# Patient Record
Sex: FEMALE | Race: WHITE
[De-identification: names, ages, dates, MRNs, and addresses within clinical notes are randomized per-mention and may not be internally consistent; named-entity substitution may affect disease eponyms.]

---

## 2018-02-02 ENCOUNTER — HOSPITAL ENCOUNTER (EMERGENCY)
Dept: HOSPITAL 17 - NEPE | Age: 73
LOS: 1 days | Discharge: HOME | End: 2018-02-03
Payer: MEDICARE

## 2018-02-02 VITALS
HEART RATE: 103 BPM | OXYGEN SATURATION: 97 % | DIASTOLIC BLOOD PRESSURE: 63 MMHG | SYSTOLIC BLOOD PRESSURE: 140 MMHG | RESPIRATION RATE: 16 BRPM

## 2018-02-02 VITALS — BODY MASS INDEX: 26.72 KG/M2 | HEIGHT: 69 IN | WEIGHT: 180.38 LBS

## 2018-02-02 DIAGNOSIS — Z88.5: ICD-10-CM

## 2018-02-02 DIAGNOSIS — Z88.2: ICD-10-CM

## 2018-02-02 DIAGNOSIS — L50.9: Primary | ICD-10-CM

## 2018-02-02 DIAGNOSIS — F32.9: ICD-10-CM

## 2018-02-02 DIAGNOSIS — Z88.8: ICD-10-CM

## 2018-02-02 DIAGNOSIS — F03.90: ICD-10-CM

## 2018-02-02 DIAGNOSIS — I10: ICD-10-CM

## 2018-02-02 DIAGNOSIS — E78.00: ICD-10-CM

## 2018-02-02 DIAGNOSIS — Z79.82: ICD-10-CM

## 2018-02-02 DIAGNOSIS — Z88.0: ICD-10-CM

## 2018-02-02 PROCEDURE — 99284 EMERGENCY DEPT VISIT MOD MDM: CPT

## 2018-02-02 PROCEDURE — 96374 THER/PROPH/DIAG INJ IV PUSH: CPT

## 2018-02-02 NOTE — PD
HPI


Chief Complaint:  Allergic/Adverse Reaction


Time Seen by Provider:  22:37


Travel History


International Travel<30 days:  No


Contact w/Intl Traveler<30days:  No


Traveled to known affect area:  No





History of Present Illness


HPI


Patient is a 72-year-old female presents to the emergency department for 

evaluation of possible allergic reaction, patient states she has been having 

itchy hives on her chest abdomen back and upper extremities for the past few 

days, she has been taking Benadryl but without significant relief, she cannot 

think of any exacerbating factors is never had hives like this before.  Denies 

any shortness of breath denies any throat swelling lip swelling abdominal pain 

nausea vomiting or diarrhea.  She states the symptoms are severe, gradually 

worsening over the past few days, associated signs symptoms as above





PFSH


Past Medical History


Asthma:  No


Autoimmune Disease:  No


Anxiety:  Yes


Depression:  Yes


Heart Rhythm Problems:  No


Cancer:  No


Cardiovascular Problems:  No


High Cholesterol:  Yes


Chemotherapy:  No


Congestive Heart Failure:  No


COPD:  No


Dementia:  Yes


Diabetes:  No


Diminished Hearing:  No


Endocrine:  Yes (ESTROGEN PATCH WEEKLY)


Genitourinary:  No


Hypertension:  Yes (CURRENT, MED CONTROLLED)


Immune Disorder:  No


Musculoskeletal:  Yes (SCIATICA)


Neurologic:  Yes


Psychiatric:  Yes


Reproductive:  No


Respiratory:  No


Immunizations Current:  Yes


Radiation Therapy:  No


Seizures:  Yes


Sickle Cell Disease:  No


Sleep Apnea:  No


Thyroid Disease:  Yes


Menopausal:  Yes





Past Surgical History


Abdominal Surgery:  No


AICD:  No


Arteriovenous Shunt:  No


Body Medical Devices:  TENS UNIT IN ABDOMINAL CAVITY


Cardiac Surgery:  No


 Section:  Yes


Ear Surgery:  No


Endocrine Surgery:  No


Eye Surgery:  Yes (BILATERAL CATARACS)


Genitourinary Surgery:  No


Gynecologic Surgery:  Yes (HYSTERECTOMY)


Hysterectomy:  Yes


Insulin Pump:  No


Joint Replacement:  No


Oral Surgery:  No


Pacemaker:  No


Thoracic Surgery:  No


Other Surgery:  Yes





Social History


Alcohol Use:  Yes (ETOH ABUSE)


Tobacco Use:  No


Substance Use:  Yes





Allergies-Medications


(Allergen,Severity, Reaction):  


Coded Allergies:  


     Sulfa (Sulfonamide Antibiotics) (Unverified  Allergy, Severe, 8/15/17)


     ciprofloxacin (Unverified  Allergy, Severe, 8/15/17)


     codeine (Unverified  Allergy, Severe, 8/15/17)


     fluconazole (Unverified  Allergy, Severe, 8/15/17)


     hydrochlorothiazide (Unverified  Allergy, Severe, 8/15/17)


     morphine (Unverified  Allergy, Severe, 8/15/17)


     penicillin G (Unverified  Allergy, Severe, 8/15/17)


     triamterene (Unverified  Allergy, Severe, 8/15/17)


     MRI PRECAUTION (Verified  Adverse Reaction, Severe, 2/7/15)


 stimulator wires in cord Dr Mcmahon declined scanning of patient


 2014 [facility]


Reported Meds & Prescriptions





Reported Meds & Active Scripts


Active


Prednisone 20 Mg Tab 60 Mg PO DAILY 5 Days


Reported


Zolpidem (Zolpidem Tartrate) 5 Mg Tab 5 Mg PO HS PRN


Vitamin B-12 (Cyanocobalamin) 1,000 Mcg Subl 1,000 Mcg SL DAILY


Senexon (Sennosides) 8.6 Mg Tab   


Robafen Cough (Dextromethorphan HBr) 15 Mg Cap 30 Mg PO Q6H PRN


Seroquel (Quetiapine Fumarate) 50 Mg Tab 50 Mg PO DAILY


Seroquel (Quetiapine Fumarate) 400 Mg Tab 400 Mg PO HS


Polyethylene Glycol 3350 Powder (Polyethylene Glycol) 17 Gram Pow 17 Gm PO DAILY


Oxycodone-Acetaminophen 7.5-325 mg Tab 1 Tab PO Q6H PRN


Ondansetron (Ondansetron HCl) 8 Mg Tab 8 Mg PO TID


Milk of Magnesia Liq (Magnesium Hydroxide) 400 Mg/5 Ml Susp 30 Ml PO DAILY PRN


Lorazepam 0.5 Mg Tab 0.5 Mg PO Q8H PRN


Lisinopril 20 Mg Tab 20 Mg PO DAILY


Levothyroxine (Levothyroxine Sodium) 100 Mcg Tab 100 Mcg PO DAILY


Levetiracetam 500 Mg Tab 500 Mg PO BID


Gabapentin 800 Mg Tab 800 Mg PO TID


Furosemide 20 Mg Tab 20 Mg PO DAILY


Folic Acid 0.8 Mg Tab 100 Mcg PO DAILY


Fentanyl Patch 72 HR (Fentanyl) 75 Mcg/Hr Patch 75 Mcg T-DERMAL Q72H


     Remove old patch when new one placed.


Atorvastatin (Atorvastatin Calcium) 10 Mg Tab 10 Mg PO HS


Aspirin 81 (Aspirin) 81 Mg Tabdr 81 Mg PO DAILY


Tylenol (Acetaminophen) 325 Mg Tab 650 Mg PO Q6H PRN








Review of Systems


Except as stated in HPI:  all other systems reviewed are Neg





Physical Exam


Narrative


GENERAL: Well-developed well-nourished in no obvious distress


SKIN: Focused skin assessment warm/dry.  The patient has generalized hives 

slightly raised over the areas described in the HPI.  No desquamation, 

euthermic to palpation.  They are fairly coalescent especially on her chest and 

abdomen peer


HEAD: Atraumatic. Normocephalic. 


EYES: Pupils equal and round. No scleral icterus. No injection or drainage. 


ENT: No nasal bleeding or discharge.  Mucous membranes pink and moist.


NECK: Trachea midline. No JVD. 


CARDIOVASCULAR: Regular rate and rhythm.  No murmur appreciated.


RESPIRATORY: No accessory muscle use. Clear to auscultation. Breath sounds 

equal bilaterally. 


GASTROINTESTINAL: Abdomen soft, non-tender, nondistended. Hepatic and splenic 

margins not palpable. 


MUSCULOSKELETAL: No obvious deformities. No clubbing.  No cyanosis.  No edema. 


NEUROLOGICAL: Awake and alert. No obvious cranial nerve deficits.  Motor 

grossly within normal limits. Normal speech.


PSYCHIATRIC: Appropriate mood and affect; insight and judgment normal.





Data


Data


Last Documented VS





Vital Signs








  Date Time  Temp Pulse Resp B/P (MAP) Pulse Ox O2 Delivery O2 Flow Rate FiO2


 


2/3/18 03:56        


 


2/3/18 01:13  85 18  96 Room Air  








Orders





 Orders


Dexamethasone Inj (Decadron Inj) (18 22:45)


Acetaminophen (Tylenol) (2/3/18 03:15)


Ed Discharge Order (2/3/18 03:23)








MDM


Medical Decision Making


Medical Screen Exam Complete:  Yes


Emergency Medical Condition:  Yes


Differential Diagnosis


Hives, allergic reaction, angioedema unlikely.


Narrative Course


Patient room to the emergency department, observed for 6 hours after Decadron 

given in the emergency depart and Benadryl was given by EMS.  Airway remained 

widely patent, vital signs stable, she has had no further complaints in the 

emergency department and her symptoms are getting better except for the rash.  

At this time she is stable for discharge, steroids for the next few days and 

Benadryl as needed.





Diagnosis





 Primary Impression:  


 Hives


***Med/Other Pt SpecificInfo:  Prescription(s) given


Scripts


Prednisone (Prednisone) 20 Mg Tab


60 MG PO DAILY for 5 Days, #15 TAB 0 Refills


   Prov: Harshad Le MD         2/3/18


Disposition:  01 DISCHARGE HOME


Condition:  Stable











Harshad Le MD 2018 23:33

## 2018-02-03 VITALS
RESPIRATION RATE: 18 BRPM | HEART RATE: 85 BPM | DIASTOLIC BLOOD PRESSURE: 57 MMHG | SYSTOLIC BLOOD PRESSURE: 114 MMHG | OXYGEN SATURATION: 96 %

## 2018-02-03 VITALS
DIASTOLIC BLOOD PRESSURE: 48 MMHG | RESPIRATION RATE: 18 BRPM | SYSTOLIC BLOOD PRESSURE: 85 MMHG | OXYGEN SATURATION: 95 % | HEART RATE: 81 BPM